# Patient Record
Sex: MALE | Race: WHITE | Employment: FULL TIME | URBAN - METROPOLITAN AREA
[De-identification: names, ages, dates, MRNs, and addresses within clinical notes are randomized per-mention and may not be internally consistent; named-entity substitution may affect disease eponyms.]

---

## 2018-05-18 ENCOUNTER — TRANSFERRED RECORDS (OUTPATIENT)
Dept: HEALTH INFORMATION MANAGEMENT | Facility: CLINIC | Age: 48
End: 2018-05-18

## 2020-05-01 ENCOUNTER — APPOINTMENT (OUTPATIENT)
Dept: GENERAL RADIOLOGY | Facility: CLINIC | Age: 50
End: 2020-05-01
Attending: EMERGENCY MEDICINE
Payer: COMMERCIAL

## 2020-05-01 ENCOUNTER — HOSPITAL ENCOUNTER (EMERGENCY)
Facility: CLINIC | Age: 50
Discharge: HOME | End: 2020-05-01
Attending: EMERGENCY MEDICINE | Admitting: EMERGENCY MEDICINE
Payer: COMMERCIAL

## 2020-05-01 ENCOUNTER — TRANSFERRED RECORDS (OUTPATIENT)
Dept: HEALTH INFORMATION MANAGEMENT | Facility: CLINIC | Age: 50
End: 2020-05-01

## 2020-05-01 ENCOUNTER — APPOINTMENT (OUTPATIENT)
Dept: CARDIOLOGY | Facility: CLINIC | Age: 50
End: 2020-05-01
Attending: EMERGENCY MEDICINE
Payer: COMMERCIAL

## 2020-05-01 VITALS
RESPIRATION RATE: 16 BRPM | SYSTOLIC BLOOD PRESSURE: 137 MMHG | TEMPERATURE: 98.9 F | HEART RATE: 105 BPM | DIASTOLIC BLOOD PRESSURE: 92 MMHG | OXYGEN SATURATION: 98 %

## 2020-05-01 DIAGNOSIS — R55 SYNCOPE AND COLLAPSE: ICD-10-CM

## 2020-05-01 DIAGNOSIS — I42.9 CARDIOMYOPATHY, UNSPECIFIED TYPE (H): ICD-10-CM

## 2020-05-01 LAB
ANION GAP SERPL CALCULATED.3IONS-SCNC: 4 MMOL/L (ref 3–14)
BASOPHILS # BLD AUTO: 0.1 10E9/L (ref 0–0.2)
BASOPHILS NFR BLD AUTO: 0.8 %
BUN SERPL-MCNC: 16 MG/DL (ref 7–30)
CALCIUM SERPL-MCNC: 9 MG/DL (ref 8.5–10.1)
CHLORIDE SERPL-SCNC: 104 MMOL/L (ref 94–109)
CO2 SERPL-SCNC: 27 MMOL/L (ref 20–32)
CREAT SERPL-MCNC: 0.76 MG/DL (ref 0.66–1.25)
DIFFERENTIAL METHOD BLD: ABNORMAL
EOSINOPHIL # BLD AUTO: 0.1 10E9/L (ref 0–0.7)
EOSINOPHIL NFR BLD AUTO: 0.5 %
ERYTHROCYTE [DISTWIDTH] IN BLOOD BY AUTOMATED COUNT: 13.2 % (ref 10–15)
GFR SERPL CREATININE-BSD FRML MDRD: >90 ML/MIN/{1.73_M2}
GLUCOSE SERPL-MCNC: 100 MG/DL (ref 70–99)
HCT VFR BLD AUTO: 49.5 % (ref 40–53)
HGB BLD-MCNC: 15.7 G/DL (ref 13.3–17.7)
IMM GRANULOCYTES # BLD: 0.2 10E9/L (ref 0–0.4)
IMM GRANULOCYTES NFR BLD: 1.7 %
LYMPHOCYTES # BLD AUTO: 1.4 10E9/L (ref 0.8–5.3)
LYMPHOCYTES NFR BLD AUTO: 13.1 %
MCH RBC QN AUTO: 29.1 PG (ref 26.5–33)
MCHC RBC AUTO-ENTMCNC: 31.7 G/DL (ref 31.5–36.5)
MCV RBC AUTO: 92 FL (ref 78–100)
MONOCYTES # BLD AUTO: 0.7 10E9/L (ref 0–1.3)
MONOCYTES NFR BLD AUTO: 6.4 %
NEUTROPHILS # BLD AUTO: 8.4 10E9/L (ref 1.6–8.3)
NEUTROPHILS NFR BLD AUTO: 77.5 %
NRBC # BLD AUTO: 0 10*3/UL
NRBC BLD AUTO-RTO: 0 /100
PLATELET # BLD AUTO: 347 10E9/L (ref 150–450)
POTASSIUM SERPL-SCNC: 4.1 MMOL/L (ref 3.4–5.3)
RBC # BLD AUTO: 5.4 10E12/L (ref 4.4–5.9)
SODIUM SERPL-SCNC: 135 MMOL/L (ref 133–144)
TROPONIN I SERPL-MCNC: <0.015 UG/L (ref 0–0.04)
WBC # BLD AUTO: 10.9 10E9/L (ref 4–11)

## 2020-05-01 PROCEDURE — 84484 ASSAY OF TROPONIN QUANT: CPT | Performed by: EMERGENCY MEDICINE

## 2020-05-01 PROCEDURE — 0298T ZIO PATCH HOLTER ADULT PEDIATRIC GREATER THAN 48 HRS: CPT | Performed by: INTERNAL MEDICINE

## 2020-05-01 PROCEDURE — 71046 X-RAY EXAM CHEST 2 VIEWS: CPT

## 2020-05-01 PROCEDURE — 0296T ZIO PATCH HOLTER ADULT PEDIATRIC GREATER THAN 48 HRS: CPT

## 2020-05-01 PROCEDURE — 99285 EMERGENCY DEPT VISIT HI MDM: CPT | Mod: 25

## 2020-05-01 PROCEDURE — 93005 ELECTROCARDIOGRAM TRACING: CPT | Mod: 59

## 2020-05-01 PROCEDURE — 80048 BASIC METABOLIC PNL TOTAL CA: CPT | Performed by: EMERGENCY MEDICINE

## 2020-05-01 PROCEDURE — 85025 COMPLETE CBC W/AUTO DIFF WBC: CPT | Performed by: EMERGENCY MEDICINE

## 2020-05-01 ASSESSMENT — ENCOUNTER SYMPTOMS
FEVER: 0
DIARRHEA: 0
SHORTNESS OF BREATH: 0
COUGH: 0

## 2020-05-01 NOTE — ED PROVIDER NOTES
History     Chief Complaint:  Syncope.    The history is provided by the patient.      Julieta Michaels is a 49 year old male with a history of HTN, CHF who presents to the emergency department for evaluation of syncope. The patient reports that 2 days ago he was eating dinner in the car with his family when without any prior symptoms he had a short 2-3 second episode of syncope. Denies prior leg swelling or shortness of breath. He was seen at the [facility] today and they were concerned about his abnormal EKG results which caused him to present to the ED. Denies fever, cough or diarrhea. Patient is a  but has not flown since early March. The patient does have a history of a similar episode but notes this was a number of years ago and that he did have a full workup with EKG and echocardiogram afterwards.    Allergies:  NKDA    Medications:    Amlodipine  Lisinopril   Metoprolol     Past Medical History:    HTN  Cardiomyopathy  Syncope and collapse  Morbid obesity  Alcohol abuse, episodic  Tachycardia  Sleep apnea  Proteinuria  Renal failure  CHF    Past Surgical History:    The patient does not have any pertinent past surgical history on file.    Family History:    Father - HTN, Heart Disease  Grandfather - HTN    Social History:  No past smoking, alcohol or drug use on file.  Presents alone today.     Review of Systems   Constitutional: Negative for fever.   Respiratory: Negative for cough and shortness of breath.    Cardiovascular: Negative for leg swelling.   Gastrointestinal: Negative for diarrhea.   Neurological: Positive for syncope.   All other systems reviewed and are negative.        Physical Exam     Patient Vitals for the past 24 hrs:   BP Temp Temp src Pulse Heart Rate Resp SpO2   05/01/20 1343 -- -- -- -- -- 16 98 %   05/01/20 1330 (!) 137/92 -- -- -- -- -- --   05/01/20 1220 -- -- -- -- 103 19 --   05/01/20 1200 (!) 130/97 -- -- 105 -- -- 100 %   05/01/20 1130 (!) 149/109  98.9  F (37.2  C) Oral 106 106 16 100 %       Physical Exam  General: The patient is alert, in no respiratory distress.    HENT: Mucous membranes moist.    Cardiovascular: Regular rate and rhythm. Good pulses in all four extremities. Normal capillary refill and skin turgor.     Respiratory: Lungs are clear. No nasal flaring. No retractions. No wheezing, no crackles.    Gastrointestinal: Abdomen soft. No guarding, no rebound. No palpable hernias.     Musculoskeletal: No gross deformity.     Skin: No rashes or petechiae.     Neurologic: The patient is alert and oriented x3. GCS 15. No testable cranial nerve deficit. Follows commands with clear and appropriate speech. Gives appropriate answers. Good strength in all extremities. No gross neurologic deficit. Gross sensation intact. Pupils are round and reactive. No meningismus.     Lymphatic: No cervical adenopathy. No lower extremity swelling.    Psychiatric: The patient is non-tearful.      Emergency Department Course     ECG:  Time: 1208  Vent. Rate 99 bpm. VT interval 128. QRS duration 108. QT/QTc 368/472. P-R-T axis 48 -37 46.  Normal sinus rhythm.  Left axis deviation.  No significant change compared to EKG dated 5/18/18.  Read time: 1209.    Imaging:  Radiology findings were communicated with the patient who voiced understanding of the findings.    Chest XR, PA & LAT   IMPRESSION: Hazy opacity at the right lung base medially could be related to pneumonia or edema. There are likely a few small calcified granulomas in the left lung. No pleural effusions. Heart size and  pulmonary vascularity are within normal limits.  As per radiology.    Laboratory:  Laboratory findings were communicated with the patient who voiced understanding of the findings.    CBC: WBC: 10.9, HGB: 15.7, PLT: 347  BMP: Glucose 100 (H), o/w WNL (Creatinine: 0.76)    Troponin I (Collected 142): <0.015    Emergency Department Course:  Past medical records, nursing notes, and vitals  reviewed.    1159 I performed an exam of the patient as documented above.     EKG obtained in the ED, see results above.   IV was inserted and blood was drawn for laboratory testing, results above.  The patient was sent for a XR while in the emergency department, results above.     1310 I rechecked the patient and discussed the results of his workup thus far.     1336 Zio Patch Holter was placed.     Findings and plan explained to the Patient. Patient discharged home with instructions regarding supportive care, medications, and reasons to return. The importance of close follow-up was reviewed.     I personally reviewed the laboratory and imaging results with the Patient and answered all related questions prior to discharge.     Impression & Plan     Medical Decision Making:  The patient presented 2 days after a brief syncopal episode while laughing and eating his car.  He said he was out for period of seconds had no antecedent symptoms during that time and no signs to suggest another neurologic event.  I felt this was most consistent with a vasovagal episode.  However in review of history with the patient as well as his chart he has had a remote history of cardiomyopathy requiring requiring diuresis and further work-up.  I did review his EKG from clinic today as well as his previous EKG and they look very similar.  A chest x-ray was ordered to look for signs of cardiomegaly and on that there was a small area of some consolidation most likely fluid.  I did not feel that this indicated a pneumonia and he lacked cough shortness of breath or other symptoms.  I did however consider PE but discussed this with the patient he has not flown anywhere recently he did not have any chest pain and I think his lower risk for a PE.  The patient however was aware of this and felt comfortable holding off.  I did consider arrhythmia and set him up for ZIO Patch as an outpatient.  His labs here are otherwise reassuring he does not  appear toxic.  Not that this indicates ACS or other processes several days since the incident and feels quite good.  He 11 follow-up as an outpatient I discussed what symptoms he should return for and he was discharged home in good condition.    Diagnosis:    ICD-10-CM    1. Syncope and collapse  R55    2. Cardiomyopathy, unspecified type (H)  I42.9        Disposition:  Discharged to home.    Scribe Disclosure:  I, Speedy Pride, am serving as a scribe at 11:55 AM on 5/1/2020 to document services personally performed by Fabio Dodson MD based on my observations and the provider's statements to me.          Fabio Dodson MD  05/01/20 0685

## 2020-05-01 NOTE — ED TRIAGE NOTES
Pt sent from [facility] for concerns abnormal EKG. Went into clinic today after being urged by family after having a syncopal episode 2 days ago. Denies any concerning symptoms at this time.

## 2020-05-01 NOTE — ED AVS SNAPSHOT
[facility] Emergency Department  [address]  [facility] [address]  Phone:  [phone]  Fax:  [phone]                                    Julieta Michaels   MRN: [id number]    Department:  [facility] Emergency Department   Date of Visit:  5/1/2020           After Visit Summary Signature Page    I have received my discharge instructions, and my questions have been answered. I have discussed any challenges I see with this plan with the nurse or doctor.    ..........................................................................................................................................  Patient/Patient Representative Signature      ..........................................................................................................................................  Patient Representative Print Name and Relationship to Patient    ..................................................               ................................................  Date                                   Time    ..........................................................................................................................................  Reviewed by Signature/Title    ...................................................              ..............................................  Date                                               Time          22EPIC Rev 08/18

## 2020-05-02 LAB — INTERPRETATION ECG - MUSE: NORMAL

## 2020-05-19 ENCOUNTER — TELEPHONE (OUTPATIENT)
Dept: CARDIOLOGY | Facility: CLINIC | Age: 50
End: 2020-05-19

## 2020-05-19 NOTE — TELEPHONE ENCOUNTER
Called telephone number listed for pt - no answer, voice message is not secure I.e. does not list the persons name.  Did not leave message.  Ziopatch results noted - abnormal    Called again - no answer.  Left general message to call back.

## 2021-03-12 ENCOUNTER — IMMUNIZATION (OUTPATIENT)
Dept: NURSING | Facility: CLINIC | Age: 51
End: 2021-03-12
Payer: COMMERCIAL

## 2021-03-12 PROCEDURE — 91300 PR COVID VAC PFIZER DIL RECON 30 MCG/0.3 ML IM: CPT

## 2021-03-12 PROCEDURE — 0001A PR COVID VAC PFIZER DIL RECON 30 MCG/0.3 ML IM: CPT

## 2021-04-02 ENCOUNTER — IMMUNIZATION (OUTPATIENT)
Dept: NURSING | Facility: CLINIC | Age: 51
End: 2021-04-02
Attending: INTERNAL MEDICINE
Payer: COMMERCIAL

## 2021-04-02 PROCEDURE — 0002A PR COVID VAC PFIZER DIL RECON 30 MCG/0.3 ML IM: CPT

## 2021-04-02 PROCEDURE — 91300 PR COVID VAC PFIZER DIL RECON 30 MCG/0.3 ML IM: CPT

## 2021-05-01 ENCOUNTER — HEALTH MAINTENANCE LETTER (OUTPATIENT)
Age: 51
End: 2021-05-01

## 2021-10-10 ENCOUNTER — HEALTH MAINTENANCE LETTER (OUTPATIENT)
Age: 51
End: 2021-10-10

## 2022-05-22 ENCOUNTER — HEALTH MAINTENANCE LETTER (OUTPATIENT)
Age: 52
End: 2022-05-22

## 2022-09-24 ENCOUNTER — HEALTH MAINTENANCE LETTER (OUTPATIENT)
Age: 52
End: 2022-09-24

## 2023-06-04 ENCOUNTER — HEALTH MAINTENANCE LETTER (OUTPATIENT)
Age: 53
End: 2023-06-04